# Patient Record
Sex: MALE
[De-identification: names, ages, dates, MRNs, and addresses within clinical notes are randomized per-mention and may not be internally consistent; named-entity substitution may affect disease eponyms.]

---

## 2021-12-21 ENCOUNTER — HOSPITAL ENCOUNTER (EMERGENCY)
Dept: HOSPITAL 97 - ER | Age: 31
LOS: 1 days | Discharge: HOME | End: 2021-12-22
Payer: SELF-PAY

## 2021-12-21 DIAGNOSIS — F15.10: Primary | ICD-10-CM

## 2021-12-21 LAB
ALBUMIN SERPL BCP-MCNC: 3.6 G/DL (ref 3.4–5)
ALP SERPL-CCNC: 112 U/L (ref 45–117)
ALT SERPL W P-5'-P-CCNC: 60 U/L (ref 12–78)
AST SERPL W P-5'-P-CCNC: 20 U/L (ref 15–37)
BUN BLD-MCNC: 16 MG/DL (ref 7–18)
GLUCOSE SERPLBLD-MCNC: 131 MG/DL (ref 74–106)
HCT VFR BLD CALC: 40 % (ref 39.6–49)
INR BLD: 1.09
LYMPHOCYTES # SPEC AUTO: 1.6 K/UL (ref 0.7–4.9)
PMV BLD: 9.4 FL (ref 7.6–11.3)
POTASSIUM SERPL-SCNC: 3.8 MMOL/L (ref 3.5–5.1)
RBC # BLD: 4.92 M/UL (ref 4.33–5.43)

## 2021-12-21 PROCEDURE — 85025 COMPLETE CBC W/AUTO DIFF WBC: CPT

## 2021-12-21 PROCEDURE — 80320 DRUG SCREEN QUANTALCOHOLS: CPT

## 2021-12-21 PROCEDURE — 51702 INSERT TEMP BLADDER CATH: CPT

## 2021-12-21 PROCEDURE — 80329 ANALGESICS NON-OPIOID 1 OR 2: CPT

## 2021-12-21 PROCEDURE — 80307 DRUG TEST PRSMV CHEM ANLYZR: CPT

## 2021-12-21 PROCEDURE — 80048 BASIC METABOLIC PNL TOTAL CA: CPT

## 2021-12-21 PROCEDURE — 99284 EMERGENCY DEPT VISIT MOD MDM: CPT

## 2021-12-21 PROCEDURE — 85730 THROMBOPLASTIN TIME PARTIAL: CPT

## 2021-12-21 PROCEDURE — 93005 ELECTROCARDIOGRAM TRACING: CPT

## 2021-12-21 PROCEDURE — 81003 URINALYSIS AUTO W/O SCOPE: CPT

## 2021-12-21 PROCEDURE — 80076 HEPATIC FUNCTION PANEL: CPT

## 2021-12-21 PROCEDURE — 82550 ASSAY OF CK (CPK): CPT

## 2021-12-21 PROCEDURE — 85610 PROTHROMBIN TIME: CPT

## 2021-12-21 PROCEDURE — 36415 COLL VENOUS BLD VENIPUNCTURE: CPT

## 2021-12-22 VITALS — TEMPERATURE: 97.3 F

## 2021-12-22 VITALS — OXYGEN SATURATION: 100 %

## 2021-12-22 VITALS — SYSTOLIC BLOOD PRESSURE: 119 MMHG | DIASTOLIC BLOOD PRESSURE: 72 MMHG

## 2021-12-22 LAB
METHAMPHET UR QL SCN: POSITIVE
THC SERPL-MCNC: NEGATIVE NG/ML

## 2021-12-22 NOTE — ER
Nurse's Notes                                                                                     

 [facility]                                                                 

Name: Manuel Sandhu                                                                           

Age: 31 yrs                                                                                       

Sex: Male                                                                                         

: 1990                                                                                   

MRN: [id number]                                                                                   

Arrival Date: 2021                                                                          

Time: 21:33                                                                                       

Account#: [id number]                                                                            

Bed 3                                                                                             

Private MD:                                                                                       

Diagnosis: Methamphetamine abuse                                                                  

                                                                                                  

Presentation:                                                                                     

                                                                                             

21:38 Chief complaint: Patient states: "I'm normally on Methadone...but I've been using it    franklin  

      for partying....". Coronavirus screen: Client denies travel out of the [place] in the last     

      14 days. At this time, the client does not indicate any symptoms associated with            

      coronavirus-19. Ebola Screen: Patient negative for fever greater than or equal to 101.5     

      degrees Fahrenheit, and additional compatible Ebola Virus Disease symptoms Patient          

      denies exposure to infectious person. Patient denies travel to an Ebola-affected area       

      in the 21 days before illness onset. Initial Sepsis Screen: Does the patient meet any 2     

      criteria? No. Patient's initial sepsis screen is negative. Initial Sepsis Screen: Does      

      the patient have a suspected source of infection? No. Patient's initial sepsis screen       

      is negative. Risk Assessment: Do you want to hurt yourself or someone else? Patient         

      reports no desire to harm self or others. Onset of symptoms was 2021. Care     

      prior to arrival: None. Activity prior to arrival: None.                                    

21:38 Method Of Arrival: EMS: [facility]  

21:38 Acuity: VALERIE 3                                                                           franklin  

                                                                                                  

Triage Assessment:                                                                                

21:53 General: Appears in no apparent distress. Emotionally labile. General: Behavior is      franklin  

      cooperative. Pain: Denies pain.                                                             

                                                                                                  

Historical:                                                                                       

- Allergies:                                                                                      

21:49 No Known Allergies;                                                                     franklin  

- Home Meds:                                                                                      

21:49 methadone 40 mg Oral TbSO 1 tab once daily for "a wafer and a half every day" [Active]; franklin  

- PMHx:                                                                                           

21:49 None;                                                                                   franklin  

- PSHx:                                                                                           

21:49 None;                                                                                   franklin  

                                                                                                  

- Immunization history:: "I don't want that".                                                     

- Social history:: Smoking status: Patient denies any tobacco usage or history of.                

  Patient uses street drugs, heroin, marijuana.                                                   

- Family history:: Grandfather has/had diabetes, heart disease.                                   

- Code Status:: Full code.                                                                        

- Coronavirus screen:: The patient has NOT traveled to [place] in the past 14 days. The             

  patient has NOT had contact with known/suspected case of Coronavirus?.                          

- Ebola Screening: : Patient negative for fever greater than or equal to 101.5 degrees            

  Fahrenheit, and additional compatible Ebola Virus Disease symptoms Patient denies               

  exposure to infectious person Patient denies travel to an Ebola-affected area in the            

  21 days before illness onset No symptoms or risks identified at this time.                      

                                                                                                  

                                                                                                  

Screenin:58 Abuse screen: Denies threats or abuse. Denies injuries from another. Nutritional        franklin  

      screening: No deficits noted. Tuberculosis screening: No symptoms or risk factors           

      identified. Fall Risk None identified.                                                      

                                                                                                  

Assessment:                                                                                       

21:55 General: Appears in no apparent distress. Pt is a little drowsy and responding well. He franklin  

      received no Narcan from EMS and reports "Bettie", his girlfriend", called EMS, as she      

      was "afraid he was going to die and may have had a seizure".. Pain: Denies pain. Neuro:     

      No deficits noted. Respiratory: No deficits noted. GI: No deficits noted. : No            

      deficits noted. EENT: No deficits noted. Derm: No deficits noted. Musculoskeletal: No       

      deficits noted.                                                                             

22:25 General: Patient is attempting to urinate at this time. He states that he cannot at the franklin  

      moment..                                                                                    

                                                                                             

00:22 Reassessment: Patient appears in no apparent distress at this time. No changes from     tw5 

      previously documented assessment. General: Behavior is drowsy.                              

01:27 Reassessment: No changes from previously documented assessment.                         sm5 

                                                                                                  

Psych:                                                                                            

                                                                                             

22:05 [place] Suicide Severity Screening: In the past month, have you wished you were dead   sm5 

      or wished you could go to sleep and not wake up? Patient responds "No." "I just had a       

      bad night and relapsed" "In the past month, have you actually had any thoughts of           

      killing yourself?" Patient responds "no." "In your lifetime, have you ever done             

      anything, started to do anything, or prepared to do anything to end your life?" Patient     

      responds "no.". Subjective: Delusions are denied, Hallucinations are denied. Objective:     

      Patient is cooperative. Interventions: Patient placed in hospital gown. Safety Checks:      

      Door is open. Patient uses heroin Last use was 1 hours ago. Commitment: n/a.                

                                                                                                  

Vital Signs:                                                                                      

21:38  / 88; Pulse 85; Resp 12; Pulse Ox 99% on R/A; Weight 99.79 kg; Height 6 ft. 1    franklin  

      in. (185.42 cm) (R);                                                                        

21:54  / 88; Pulse 86; Resp 13; Pulse Ox 99% on R/A;                                    franklin  

22:25  / 81; Pulse 95; Resp 14; Pulse Ox 98% on R/A;                                    franklin  

23:47 Temp 97.3;                                                                              franklin  

                                                                                             

00:30  / 70; Pulse 79; Resp 16; Pulse Ox 100% on R/A;                                   sm5 

01:28  / 72; Pulse 76; Resp 17; Pulse Ox 100% on R/A;                                   sm5 

                                                                                             

21:38 Body Mass Index 29.03 (99.79 kg, 185.42 cm)                                             franklin  

                                                                                                  

ED Course:                                                                                        

                                                                                             

21:33 Patient arrived in ED.                                                                  mw2 

21:36 Moo Waller MD is Attending Physician.                                             [facility] 

21:38 Katty Talbert, RN is Primary Nurse.                                                 franklin  

21:44 Triage completed.                                                                       franklin  

21:55 Arm band placed on Emesis basin given.                                                  franklin  

21:59 No provider procedures requiring assistance completed.                                  franklin  

22:05 Acetaminophen Sent.                                                                     sm5 

22:05 Basic Metabolic Panel Sent.                                                             sm5 

22:05 CBC with Diff Sent.                                                                     sm5 

22:05 ETOH Level Sent.                                                                        sm5 

22:05 Hepatic Function Sent.                                                                  sm5 

22:05 PT-INR Sent.                                                                            sm5 

22:05 Ptt, Activated Sent.                                                                    sm5 

22:05 Salicylate Sent.                                                                        sm5 

22:26 Patient has correct armband on for positive identification. Placed in gown. Bed in low  tw5 

      position. Call light in reach. Side rails up X2. Cardiac monitor on. Pulse ox on. NIBP      

      on. Door closed. Noise minimized. Moved to private room. Warm blanket given. Verbal         

      reassurance given.                                                                          

22:26 Diet: Patient given water.                                                              tw5 

23:08 No apparent distress. I asked to pray with the pt and he agreed. The pt is much less    franklin  

      emotionally labile and thanked me.                                                          

23:22 Urine Drug Screen Sent.                                                                 franklin  

23:22 Straight cath inserted, using sterile technique, Specimen obtained. Pt tolerated.       franklin  

23:39 Urine Drug Screen Sent.                                                                 tw5 

                                                                                             

01:28 IV discontinued, intact, bleeding controlled, No redness/swelling at site. Pressure     5 

      dressing applied.                                                                           

                                                                                                  

Administered Medications:                                                                         

No medications were administered                                                                  

                                                                                                  

                                                                                                  

Outcome:                                                                                          

01:12 Discharge ordered by MD.                                                                [facility] 

01:28 Discharged to home ambulatory, with significant other.                                  [facility] 

01:28 Condition: stable                                                                           

01:28 Discharge instructions given to patient, significant other, Instructed on discharge         

      instructions, follow up and referral plans. Demonstrated understanding of instructions,     

      follow-up care.                                                                             

01:28 Patient left the ED.                                                                    [facility] 

                                                                                                  

Signatures:                                                                                       

Regis King                            2                                                  

Moo Waller MD MD   7                                                  

Lorena Malcolm                                5                                                  

Addis Paiz, JULIANNA                        RN   5                                                  

Katty Talbert RN RN   franklin                                                   

                                                                                                  

Corrections: (The following items were deleted from the chart)                                    

                                                                                             

23:13 22:05 URINE DRUG SCREEN+CHEM UR.LAB.BRZ drawn and sent. [facility]                             EDMS

23:58 23:39 Urine Drug Screen drawn and sent. [facility]                                             EDMS

                                                                                                  

**************************************************************************************************

## 2021-12-22 NOTE — EDPHYS
Physician Documentation                                                                           

 [facility]                                                                 

Name: Manuel Sandhu                                                                           

Age: 31 yrs                                                                                       

Sex: Male                                                                                         

: 1990                                                                                   

MRN: [id number]                                                                                   

Arrival Date: 2021                                                                          

Time: 21:33                                                                                       

Account#: [id number]                                                                            

Bed 3                                                                                             

Private MD:                                                                                       

ED Physician Moo Waller                                                                      

HPI:                                                                                              

                                                                                             

22:04 This 31 yrs old Male presents to ER via EMS with complaints of Drug Abuse.              mh7 

22:04 The patient presents to the emergency department after a known overdose, a result of    7 

      recreational substance abuse. Context: Method: it is confirmed or suspected that the        

      patient injected a substance, heroin, Time: today, Extent: it is unknown what amount        

      the patient injected, the OD/poisoning occurred at at home, and was witnessed no one,       

      Psychiatric history: none, Previous OD/poisoning history: yes, Years ago. Associated        

      signs and symptoms: Pertinent positives: decreased level of consciousness, tearfulness,     

      Pertinent negatives: anxiety, apnea, auditory hallucinations, burning of skin,              

      depression, diaphoresis, diarrhea, dizziness, incontinence, nausea, palpitations,           

      shortness of breath, visual hallucinations, vomiting. Severity of symptoms: At their        

      worst the symptoms were moderate today, in the emergency department the symptoms have       

      resolved and did so just prior to arrival. Patient states that he used heroin tonight       

      by injection. He states he has not used in a year and is on methadone but has not taken     

      in a couple days. He states that the using he remembers waking up in bed and paramedics     

      were around him. According to EMS, he was fully awake and alert and without any             

      breathing difficulty or other complaints at the scene..                                     

                                                                                                  

Historical:                                                                                       

- Allergies:                                                                                      

21:49 No Known Allergies;                                                                     franklin  

- Home Meds:                                                                                      

21:49 methadone 40 mg Oral TbSO 1 tab once daily for "a wafer and a half every day" [Active]; franklin  

- PMHx:                                                                                           

21:49 None;                                                                                   franklin  

- PSHx:                                                                                           

21:49 None;                                                                                   franklin  

                                                                                                  

- Immunization history:: "I don't want that".                                                     

- Social history:: Smoking status: Patient denies any tobacco usage or history of.                

  Patient uses street drugs, heroin, marijuana.                                                   

- Family history:: Grandfather has/had diabetes, heart disease.                                   

- Code Status:: Full code.                                                                        

- Coronavirus screen:: The patient has NOT traveled to [place] in the past 14 days. The             

  patient has NOT had contact with known/suspected case of Coronavirus?.                          

- Ebola Screening: : Patient negative for fever greater than or equal to 101.5 degrees            

  Fahrenheit, and additional compatible Ebola Virus Disease symptoms Patient denies               

  exposure to infectious person Patient denies travel to an Ebola-affected area in the            

  21 days before illness onset No symptoms or risks identified at this time.                      

                                                                                                  

                                                                                                  

ROS:                                                                                              

22:04 Constitutional: Negative for fever, chills, and weight loss, Eyes: Negative for injury, mh7 

      pain, redness, and discharge, ENT: Negative for injury, pain, and discharge, Neck:          

      Negative for injury, pain, and swelling, Cardiovascular: Negative for chest pain,           

      palpitations, and edema, Respiratory: Negative for shortness of breath, cough,              

      wheezing, and pleuritic chest pain, Abdomen/GI: Negative for abdominal pain, nausea,        

      vomiting, diarrhea, and constipation, Back: Negative for injury and pain, : Negative      

      for injury, bleeding, discharge, and swelling, MS/Extremity: Negative for injury and        

      deformity, Skin: Negative for injury, rash, and discoloration, Neuro: Negative for          

      headache, weakness, numbness, tingling, and seizure, Psych: Negative for depression,        

      anxiety, suicide ideation, homicidal ideation, and hallucinations, Allergy/Immunology:      

      Negative for hives, rash, and allergies, Endocrine: Negative for neck swelling,             

      polydipsia, polyuria, polyphagia, and marked weight changes, Hematologic/Lymphatic:         

      Negative for swollen nodes, abnormal bleeding, and unusual bruising.                        

                                                                                                  

Exam:                                                                                             

22:04 Constitutional:  This is a well developed, well nourished patient who is awake, alert,  mh7 

      and in no acute distress. Head/Face:  Normocephalic, atraumatic. Eyes:  Pupils equal        

      round and reactive to light, extra-ocular motions intact.  Lids and lashes normal.          

      Conjunctiva and sclera are non-icteric and not injected.  Cornea within normal limits.      

      Periorbital areas with no swelling, redness, or edema. Neck:  Trachea midline, no           

      thyromegaly or masses palpated, and no cervical lymphadenopathy.  Supple, full range of     

      motion without nuchal rigidity, or vertebral point tenderness.  No Meningismus.             

      Chest/axilla:  Normal chest wall appearance and motion.  Nontender with no deformity.       

      No lesions are appreciated. Cardiovascular:  Regular rate and rhythm with a normal S1       

      and S2.  No gallops, murmurs, or rubs.  Normal PMI, no JVD.  No pulse deficits.             

      Respiratory:  Lungs have equal breath sounds bilaterally, clear to auscultation and         

      percussion.  No rales, rhonchi or wheezes noted.  No increased work of breathing, no        

      retractions or nasal flaring. Abdomen/GI:  Soft, non-tender, with normal bowel sounds.      

      No distension or tympany.  No guarding or rebound.  No evidence of tenderness               

      throughout. Back:  No spinal tenderness.  No costovertebral tenderness.  Full range of      

      motion. Skin:  Warm, dry with normal turgor.  Normal color with no rashes, no lesions,      

      and no evidence of cellulitis. MS/ Extremity:  Pulses equal, no cyanosis.                   

      Neurovascular intact.  Full, normal range of motion. Neuro:  Awake and alert, GCS 15,       

      oriented to person, place, time, and situation.  Cranial nerves II-XII grossly intact.      

      Motor strength 5/5 in all extremities.  Sensory grossly intact.  Cerebellar exam            

      normal.  Normal gait.                                                                       

22:04 Psych: Behavior/mood is cooperative, Affect is calm, Oriented to person, place, time,       

      Patient has no thoughts/intents to harm self or others. Judgement / Insight is normal.      

      Memory is normal. Delusions/hallucinations are not present.                                 

                                                                                                  

Vital Signs:                                                                                      

21:38  / 88; Pulse 85; Resp 12; Pulse Ox 99% on R/A; Weight 99.79 kg; Height 6 ft. 1    franklin  

      in. (185.42 cm) (R);                                                                        

21:54  / 88; Pulse 86; Resp 13; Pulse Ox 99% on R/A;                                    franklin  

22:25  / 81; Pulse 95; Resp 14; Pulse Ox 98% on R/A;                                    franklin  

23:47 Temp 97.3;                                                                              franklin  

                                                                                             

00:30  / 70; Pulse 79; Resp 16; Pulse Ox 100% on R/A;                                   sm5 

01:28  / 72; Pulse 76; Resp 17; Pulse Ox 100% on R/A;                                   sm5 

                                                                                             

21:38 Body Mass Index 29.03 (99.79 kg, 185.42 cm)                                             franklin  

                                                                                                  

MDM:                                                                                              

01:10 Differential diagnosis: Ingestion/exposure to Heroin polypharmacy, over medication,     mh7 

      hypoglycemia. Data reviewed: vital signs, nurses notes, EMS record, lab test result(s),     

      CBC, drug level(s), acetaminophen, alcohol, salicylate, electrolytes, urine drug            

      screen. Data interpreted: Pulse oximetry: on room air is 98 %. Interpretation: normal.      

      Counseling: I had a detailed discussion with the patient and/or guardian regarding: the     

      historical points, exam findings, and any diagnostic results supporting the                 

      discharge/admit diagnosis, lab results, radiology results, the need for outpatient          

      follow up, to return to the emergency department if symptoms worsen or persist or if        

      there are any questions or concerns that arise at home. Response to treatment: the          

      patient's symptoms have resolved after treatment, the patient's blood pressure is in an     

      acceptable range, mental status has returned to baseline, the patient no longer shows       

      bradycardia, the patient is not short of breath, the patient is not tachycardic, the        

      patient's pain is gone, the patient's temperature has normalized.                           

01:12 Patient medically screened.                                                             [facility] 

                                                                                                  

                                                                                             

21:41 Order name: Acetaminophen; Complete Time: 01:05                                         [facility] 

                                                                                             

21:41 Order name: Basic Metabolic Panel; Complete Time: 01:05                                 [facility] 

                                                                                             

21:41 Order name: CBC with Diff; Complete Time: 23:18                                         [facility] 

                                                                                             

21:41 Order name: ETOH Level; Complete Time: 23:18                                            [facility] 

                                                                                             

21:41 Order name: Hepatic Function; Complete Time: 01:05                                      [facility] 

                                                                                             

21:41 Order name: PT-INR; Complete Time: 23:18                                                [facility] 

                                                                                             

21:41 Order name: Ptt, Activated; Complete Time: 23:18                                        [facility] 

                                                                                             

21:41 Order name: Salicylate; Complete Time: 23:18                                            [facility] 

                                                                                             

21:41 Order name: Urine Drug Screen; Complete Time: 00:26                                     [facility] 

                                                                                             

23:14 Order name: Urine Drug Screen                                                           [facility] 

                                                                                             

23:26 Order name: Urine Dipstick-Ancillary; Complete Time: 23:45                              [facility]

                                                                                             

00:30 Order name: Creatine Phosphokinase; Complete Time: 01:05                                [facility]

                                                                                             

21:41 Order name: EKG; Complete Time: 21:42                                                   [facility] 

                                                                                             

21:41 Order name: EKG - Nurse/Tech; Complete Time: 23:10                                      [facility] 

                                                                                             

21:41 Order name: IV Saline Lock; Complete Time: 22:05                                        [facility] 

                                                                                             

21:41 Order name: Labs collected and sent; Complete Time: 22:05                               [facility] 

                                                                                             

21:41 Order name: Suicide Screening ([facility]); Complete Time: 22:09                          [facility] 

                                                                                             

21:41 Order name: Urine Dipstick-Ancillary (obtain specimen); Complete Time: 23:22            [facility] 

                                                                                                  

Administered Medications:                                                                         

No medications were administered                                                                  

                                                                                                  

                                                                                                  

Disposition Summary:                                                                              

21 01:12                                                                                    

Discharge Ordered                                                                                 

      Location: Home                                                                          [facility] 

      Problem: an acute exacerbation                                                          [facility] 

      Symptoms: have improved                                                                 [facility] 

      Condition: Stable                                                                       [facility] 

      Diagnosis                                                                                   

        - Methamphetamine abuse                                                               [facility] 

      Followup:                                                                               [facility] 

        - With: Private Physician                                                                  

        - When: 1 - 2 days                                                                         

        - Reason: Worsening of condition, Recheck today's complaints, Continuance of care,         

      Re-evaluation by your physician                                                             

      Discharge Instructions:                                                                     

        - Discharge Summary Sheet                                                             [facility] 

        - Methamphetamines Use Disorder                                                       [facility] 

      Forms:                                                                                      

        - Medication Reconciliation Form                                                      [facility] 

        - Thank You Letter                                                                    [facility] 

        - Antibiotic Education                                                                [facility] 

        - Prescription Opioid Use                                                             [facility] 

Signatures:                                                                                       

Dispatcher [employer]                           EDMoo Early MD MD   [facility]                                                  

Katty Talbert RN                   RN   franklin                                                   

                                                                                                  

Corrections: (The following items were deleted from the chart)                                    

                                                                                             

23:13 21:42 URINE DRUG SCREEN+CHEM UR.LAB.BRZ ordered. EDMS                                   EDMS

23:58 23:15 Urine Drug Screen ordered. EDMS                                                   [facility]

                                                                                             

00:30 00:27 CREATINE PHOSPHOKINASE+C.LAB.BRZ ordered. EDMS                                    EDMS

                                                                                                  

**************************************************************************************************
No